# Patient Record
Sex: FEMALE | Race: WHITE | Employment: UNEMPLOYED
[De-identification: names, ages, dates, MRNs, and addresses within clinical notes are randomized per-mention and may not be internally consistent; named-entity substitution may affect disease eponyms.]

---

## 2017-03-03 RX ORDER — NORGESTIMATE AND ETHINYL ESTRADIOL 0.25-0.035
1 KIT ORAL DAILY
Qty: 1 PACKET | Refills: 2 | Status: SHIPPED | OUTPATIENT
Start: 2017-03-03 | End: 2017-03-07 | Stop reason: SDUPTHER

## 2017-03-06 ENCOUNTER — PATIENT MESSAGE (OUTPATIENT)
Dept: OBGYN | Facility: CLINIC | Age: 42
End: 2017-03-06

## 2017-03-07 ENCOUNTER — TELEPHONE (OUTPATIENT)
Dept: OBGYN | Facility: CLINIC | Age: 42
End: 2017-03-07

## 2017-03-07 RX ORDER — NORGESTIMATE AND ETHINYL ESTRADIOL 0.25-0.035
1 KIT ORAL DAILY
Qty: 3 PACKET | Refills: 0 | Status: SHIPPED | OUTPATIENT
Start: 2017-03-07 | End: 2017-10-20 | Stop reason: SDUPTHER

## 2017-06-05 ENCOUNTER — HOSPITAL ENCOUNTER (OUTPATIENT)
Age: 42
Discharge: HOME | End: 2017-06-05
Payer: COMMERCIAL

## 2017-06-05 PROCEDURE — 36415 COLL VENOUS BLD VENIPUNCTURE: CPT

## 2017-06-05 PROCEDURE — 82785 ASSAY OF IGE: CPT

## 2017-06-05 PROCEDURE — 86003 ALLG SPEC IGE CRUDE XTRC EA: CPT

## 2017-06-07 LAB
2000687N OAK TREE IGE: <0.34 KU/L (ref 0–0.34)
ALLERGEN BERMUDA GRASS IGE: <0.34 KU/L (ref 0–0.34)
ALLERGEN BIRCH IGE: <0.34 KU/L (ref 0–0.34)
ALLERGEN DOG DANDER IGE: 3.99 KU/L (ref 0–0.34)
ALLERGEN GERMAN COCKROACH IGE: <0.34 KU/L (ref 0–0.34)
ALLERGEN HELMINTHOSPORIUM HALODES: <0.34 KU/L (ref 0–0.34)
ALLERGEN HORMODENDRUM IGE: <0.34 KUL/L (ref 0–0.34)
ALLERGEN JOHNSON GRASS IGE: <0.34 KU/L (ref 0–0.34)
ALLERGEN LAMB'S QUARTER IGE: <0.34 KU/L (ref 0–0.34)
ALLERGEN PHOMA BETAE: <0.34 KU/L (ref 0–0.34)
ALLERGEN PIGWEED ROUGH IGE: <0.34 KU/L (ref 0–0.34)
ALLERGEN PINE WHITE IGE: <0.34 KU/L (ref 0–0.34)
ALLERGEN SHEEP SORREL (W18) IGE: <0.34 KU/L (ref 0–0.34)
ALLERGEN STEMPHYLIUM HERBARUM IGE: <0.34 KU/L (ref 0–0.34)
ALLERGEN TREE SYCAMORE: <0.34 KU/L (ref 0–0.34)
ALLERGEN WALNUT TREE IGE: <0.34 KU/L (ref 0–0.34)
ALTERNARIA ALTERNATA: <0.34 KU/L (ref 0–0.34)
ASPERGILLUS FUMIGATUS: <0.34 KU/L (ref 0–0.34)
CANDIDA ALBICANS IGE: <0.34 KU/L (ref 0–0.34)
CAT DANDER ANTIBODY: 3.42 KU/L (ref 0–0.34)
COTTONWOOD TREE: <0.34 KU/L (ref 0–0.34)
D. FARINAE: <0.34 KU/L (ref 0–0.34)
D. PTERONYSSINUS: <0.34 KU/L (ref 0–0.34)
ELM TREE: <0.34 KU/L (ref 0–0.34)
FUSARIUM MONILIFORME: <0.34 KU/L (ref 0–0.34)
IGE: 238 IU/ML
MAPLE/BOXELDER TREE: <0.34 KU/L (ref 0–0.34)
P. NOTATUM: <0.34 KU/L (ref 0–0.34)
SHORT RAGWD(A ARTEMIS.) IGE: <0.34 KU/L (ref 0–0.34)
TIMOTHY GRASS: <0.34 KU/L (ref 0–0.34)

## 2017-07-05 ENCOUNTER — TELEPHONE (OUTPATIENT)
Dept: OBGYN CLINIC | Age: 42
End: 2017-07-05

## 2017-10-20 ENCOUNTER — OFFICE VISIT (OUTPATIENT)
Dept: OBGYN CLINIC | Age: 42
End: 2017-10-20
Payer: COMMERCIAL

## 2017-10-20 ENCOUNTER — HOSPITAL ENCOUNTER (OUTPATIENT)
Age: 42
Setting detail: SPECIMEN
Discharge: HOME | End: 2017-10-20
Payer: COMMERCIAL

## 2017-10-20 VITALS
DIASTOLIC BLOOD PRESSURE: 62 MMHG | BODY MASS INDEX: 36.43 KG/M2 | WEIGHT: 213.4 LBS | HEIGHT: 64 IN | SYSTOLIC BLOOD PRESSURE: 110 MMHG

## 2017-10-20 DIAGNOSIS — N87.0 MILD DYSPLASIA OF CERVIX (CIN I): Primary | ICD-10-CM

## 2017-10-20 DIAGNOSIS — Z12.31 SCREENING MAMMOGRAM, ENCOUNTER FOR: ICD-10-CM

## 2017-10-20 PROCEDURE — G8427 DOCREV CUR MEDS BY ELIG CLIN: HCPCS | Performed by: NURSE PRACTITIONER

## 2017-10-20 PROCEDURE — 99213 OFFICE O/P EST LOW 20 MIN: CPT | Performed by: NURSE PRACTITIONER

## 2017-10-20 PROCEDURE — G8417 CALC BMI ABV UP PARAM F/U: HCPCS | Performed by: NURSE PRACTITIONER

## 2017-10-20 PROCEDURE — G8484 FLU IMMUNIZE NO ADMIN: HCPCS | Performed by: NURSE PRACTITIONER

## 2017-10-20 PROCEDURE — 1036F TOBACCO NON-USER: CPT | Performed by: NURSE PRACTITIONER

## 2017-10-20 RX ORDER — NORGESTIMATE AND ETHINYL ESTRADIOL 0.25-0.035
1 KIT ORAL DAILY
Qty: 3 PACKET | Refills: 1 | Status: SHIPPED | OUTPATIENT
Start: 2017-10-20 | End: 2018-06-07 | Stop reason: SDUPTHER

## 2017-10-20 NOTE — PROGRESS NOTES
Subjective: This 43 y.o. woman comes in today for pap smear only. Her most recent annual exam was \"sometime in 2016. Her most recent Pap smear was on 11.22.16 and showed no abnormalities. Previous abnormal Pap smears: yes - LGSIL (DELILAH 1) Contraception: OCP (estrogen/progesterone)    Objective:     /62   Ht 5' 3.75\" (1.619 m)   Wt 213 lb 6.4 oz (96.8 kg)   LMP 10/09/2017   Breastfeeding? No   BMI 36.92 kg/m²   Pelvic Exam: cervix normal in appearance, external genitalia normal, vagina normal without discharge. Pap smear obtained. Assessment:     Screening pap smear. Plan:     Follow up in 6 month, or as indicated by Pap results.

## 2017-10-23 LAB
HPV SAMPLE: NORMAL
HPV SOURCE: NORMAL
HPV, GENOTYPE 16: NOT DETECTED
HPV, GENOTYPE 18: NOT DETECTED
HPV, HIGH RISK OTHER: NOT DETECTED
HPV, INTERPRETATION: NORMAL

## 2017-10-26 LAB — CYTOLOGY REPORT: NORMAL

## 2018-06-08 RX ORDER — NORGESTIMATE AND ETHINYL ESTRADIOL 0.25-0.035
KIT ORAL
Qty: 84 TABLET | Refills: 0 | Status: SHIPPED | OUTPATIENT
Start: 2018-06-08 | End: 2020-10-22 | Stop reason: ALTCHOICE

## 2018-12-27 ENCOUNTER — HOSPITAL ENCOUNTER (OUTPATIENT)
Age: 43
Discharge: HOME | End: 2018-12-27
Payer: COMMERCIAL

## 2018-12-27 LAB
IGA: 373 MG/DL (ref 70–400)
IGG: 1334 MG/DL (ref 700–1600)
IGM: 131 MG/DL (ref 40–230)

## 2018-12-27 PROCEDURE — 82784 ASSAY IGA/IGD/IGG/IGM EACH: CPT

## 2018-12-27 PROCEDURE — 82787 IGG 1 2 3 OR 4 EACH: CPT

## 2018-12-27 PROCEDURE — 86038 ANTINUCLEAR ANTIBODIES: CPT

## 2018-12-27 PROCEDURE — 36415 COLL VENOUS BLD VENIPUNCTURE: CPT

## 2018-12-28 LAB — ANTI-NUCLEAR ANTIBODY (ANA): NEGATIVE

## 2018-12-29 LAB
IGD: <0.7 MG/DL
IGG 1: 757 MG/DL (ref 240–1118)
IGG 2: 461 MG/DL (ref 124–549)
IGG 3: 58 MG/DL (ref 21–134)
IGG 4: 45 MG/DL (ref 1–123)

## 2019-02-02 ENCOUNTER — HOSPITAL ENCOUNTER (OUTPATIENT)
Dept: CT IMAGING | Age: 44
Discharge: HOME | End: 2019-02-04
Payer: COMMERCIAL

## 2019-02-02 DIAGNOSIS — R09.81 NASAL CONGESTION: ICD-10-CM

## 2019-02-02 PROCEDURE — 70486 CT MAXILLOFACIAL W/O DYE: CPT

## 2019-10-31 ENCOUNTER — TELEPHONE (OUTPATIENT)
Dept: OBGYN CLINIC | Age: 44
End: 2019-10-31

## 2020-10-22 ENCOUNTER — HOSPITAL ENCOUNTER (OUTPATIENT)
Age: 45
Setting detail: SPECIMEN
Discharge: HOME | End: 2020-10-22
Payer: COMMERCIAL

## 2020-10-22 ENCOUNTER — OFFICE VISIT (OUTPATIENT)
Dept: OBGYN CLINIC | Age: 45
End: 2020-10-22
Payer: COMMERCIAL

## 2020-10-22 VITALS
SYSTOLIC BLOOD PRESSURE: 94 MMHG | DIASTOLIC BLOOD PRESSURE: 68 MMHG | WEIGHT: 199 LBS | BODY MASS INDEX: 33.97 KG/M2 | HEIGHT: 64 IN

## 2020-10-22 PROBLEM — J32.9 CHRONIC SINUSITIS: Status: ACTIVE | Noted: 2018-09-13

## 2020-10-22 PROBLEM — F32.9 MAJOR DEPRESSION, SINGLE EPISODE: Status: ACTIVE | Noted: 2020-10-22

## 2020-10-22 PROBLEM — E11.9 TYPE 2 DIABETES MELLITUS WITHOUT COMPLICATION (HCC): Status: ACTIVE | Noted: 2020-10-22

## 2020-10-22 PROBLEM — J30.2 SEASONAL ALLERGIES: Status: ACTIVE | Noted: 2020-10-22

## 2020-10-22 PROBLEM — F41.9 ANXIETY DISORDER: Status: ACTIVE | Noted: 2017-06-19

## 2020-10-22 PROBLEM — K76.0 NONALCOHOLIC FATTY LIVER DISEASE: Status: ACTIVE | Noted: 2020-10-22

## 2020-10-22 PROBLEM — E11.51 PERIPHERAL VASCULAR DISORDER DUE TO DIABETES MELLITUS (HCC): Status: ACTIVE | Noted: 2020-10-22

## 2020-10-22 PROCEDURE — G8484 FLU IMMUNIZE NO ADMIN: HCPCS | Performed by: NURSE PRACTITIONER

## 2020-10-22 PROCEDURE — 99386 PREV VISIT NEW AGE 40-64: CPT | Performed by: NURSE PRACTITIONER

## 2020-10-22 RX ORDER — METFORMIN HYDROCHLORIDE 500 MG/1
TABLET, EXTENDED RELEASE ORAL
COMMUNITY
Start: 2020-07-28

## 2020-10-22 RX ORDER — EMPAGLIFLOZIN 25 MG/1
TABLET, FILM COATED ORAL
COMMUNITY
Start: 2020-07-17

## 2020-10-22 RX ORDER — ACETAMINOPHEN AND CODEINE PHOSPHATE 120; 12 MG/5ML; MG/5ML
1 SOLUTION ORAL DAILY
Qty: 84 TABLET | Refills: 4 | Status: SHIPPED | OUTPATIENT
Start: 2020-10-22 | End: 2021-03-14 | Stop reason: SDUPTHER

## 2020-10-22 RX ORDER — ESCITALOPRAM OXALATE 20 MG/1
TABLET ORAL
COMMUNITY
Start: 2020-07-28

## 2020-10-22 RX ORDER — GLIMEPIRIDE 4 MG/1
TABLET ORAL
COMMUNITY
Start: 2020-09-02

## 2020-10-22 ASSESSMENT — ENCOUNTER SYMPTOMS
ABDOMINAL DISTENTION: 0
ABDOMINAL PAIN: 0
CONSTIPATION: 0
BACK PAIN: 0
SHORTNESS OF BREATH: 0
DIARRHEA: 0
COUGH: 0

## 2020-10-22 NOTE — PROGRESS NOTES
HPI:     Jose Nolan a 39 y.o. female who presents today for:No chief complaint on file. HPI  Here for annual exam. Works at [employer]. Has 1 child- 13 y/o. Trying to add exercise. Recently dx w/HTN, DM2. Discussed contraindication for estrogen. Agreeable to change to POP. Rx to pharmacy. GYNECOLOGICHISTORY:  MenstrualHistory: No LMP recorded. Sexually Transmitted Infections: No  History of Ectopic Pregnancy:No  Menarche: 13  Cycles q 28 Days  Durationof cycles: 4  Dysmenorrhea: minmal  Sexually active: yes  Dyspareunia: none    Current Outpatient Medications   Medication Sig Dispense Refill    ESTARYLLA 0.25-35 MG-MCG per tablet TAKE 1 TABLET DAILY 84 tablet 0    ibuprofen (ADVIL;MOTRIN) 600 MG tablet Take 1 tablet by mouth every 6 hours as needed for Pain 30 tablet 0    buPROPion SR (WELLBUTRIN SR) 150 MG SR tablet Take 150 mg by mouth 2 times daily       busPIRone (BUSPAR) 15 MG tablet Take 15 mg by mouth 2 times daily       lisinopril-hydrochlorothiazide (PRINZIDE;ZESTORETIC) 20-25 MG per tablet Take 1 tablet by mouth daily       pravastatin (PRAVACHOL) 10 MG tablet Take 10 mg by mouth nightly       Multiple Vitamin (MULTI VITAMIN DAILY PO) Take 1 tablet by mouth daily       calcium carbonate (OSCAL) 500 MG TABS tablet Take 500 mg by mouth daily. No current facility-administered medications for this visit.       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+     Denies family history of breast, ovarian, uterus, colon CA     Past Surgical History:   Procedure Laterality Date    APPENDECTOMY       SECTION      x 2    COLPOSCOPY  03/11/15    DELILAH-1    COLPOSCOPY  16    Cx Bx DELILAH-1    LEEP  16     Family History   Problem Relation Age of Onset    High Blood Pressure Father     High Cholesterol Father     Heart Defect Paternal Grandmother     Heart Defect Paternal Grandfather     Other Mother 61         bowel problem, bleeding    Other Daughter 23         del, multiple health issues      Social History     Tobacco Use    Smoking status: Never Smoker    Smokeless tobacco: Never Used   Substance Use Topics    Alcohol use: No        Subjective:      Review of Systems   Constitutional: Negative for appetite change and fatigue. HENT: Negative for congestion and hearing loss. Eyes: Negative for visual disturbance. Respiratory: Negative for cough and shortness of breath. Cardiovascular: Negative for chest pain and palpitations. Gastrointestinal: Negative for abdominal distention, abdominal pain, constipation and diarrhea. Genitourinary: Negative for flank pain, frequency, menstrual problem, pelvic pain and vaginal discharge. Musculoskeletal: Negative for back pain. Neurological: Negative for syncope and headaches. Psychiatric/Behavioral: Negative for behavioral problems. Objective: There were no vitals taken for this visit. Physical Exam  Constitutional:       Appearance: She is well-developed. HENT:      Head: Normocephalic. Eyes:      Extraocular Movements: Extraocular movements intact. Conjunctiva/sclera: Conjunctivae normal.   Neck:      Musculoskeletal: Normal range of motion. Thyroid: No thyromegaly. Trachea: No tracheal deviation. Pulmonary:      Effort: Pulmonary effort is normal. No respiratory distress. Chest:      Breasts: Breasts are symmetrical.         Right: No inverted nipple. Left: No inverted nipple, mass, nipple discharge, skin change or tenderness. Abdominal:      General: Bowel sounds are normal. There is no distension. Palpations: Abdomen is soft. There is no mass. Tenderness: There is no abdominal tenderness.       Hernia: There is no hernia in the left inguinal area. Genitourinary:     Labia:         Right: No rash or lesion. Left: No rash or lesion. Vagina: No vaginal discharge or tenderness. Cervix: No cervical motion tenderness, discharge or friability. Uterus: Not deviated, not enlarged and not fixed. Adnexa:         Right: No mass, tenderness or fullness. Left: No mass, tenderness or fullness. Musculoskeletal: Normal range of motion. General: No tenderness. Lymphadenopathy:      Cervical: No cervical adenopathy. Skin:     General: Skin is warm and dry. Neurological:      General: No focal deficit present. Mental Status: She is alert and oriented to person, place, and time. Mental status is at baseline. Psychiatric:         Mood and Affect: Mood normal.         Behavior: Behavior normal.         Thought Content: Thought content normal.         Judgment: Judgment normal.           Assessment:     1. Encounter for gynecological examination    2. Encounter for screening mammogram for breast cancer          Plan:   1. Pap collected. Discussed new pap smear guidelines. Desires re-pap in 1 year. 2. Screening mammogram discussed and advised yearly if within normal limits. 3. Calcium and Vitamin D dosing reviewed. 4. Colonoscopy screening reviewed. 5. Bone density testing reviewed.    OCP changed to POP  Electronicallysigned by Mauri Eid on 10/22/2020

## 2020-10-22 NOTE — PATIENT INSTRUCTIONS
Patient Education        norethindrone  Pronunciation:  nor eth IN drone  Brand:  Aygestin, Tess, Jolie, Jolivette, Monie-Be, Ortho Micronor  What is the most important information I should know about norethindrone? You should not use this medicine if you you have: undiagnosed vaginal bleeding, breast cancer, liver disease, or a liver tumor. You may not be able to take norethindrone if you have ever had a heart attack, a stroke, or blood clot. Do not use if you are pregnant or trying to become pregnant. In some cases, you should not take norethindrone if you are nursing. What is norethindrone? Norethindrone is a form of progesterone, a female hormone important for regulating ovulation and menstruation. Norethindrone is used for birth control (contraception) to prevent pregnancy. Norethindrone is also used to treat menstrual disorders, endometriosis, or abnormal vaginal bleeding caused by a hormone imbalance. Not all brands of this medicine are for the same uses. Some brands are for use only as contraception. Others are for use in treating endometriosis or vaginal bleeding disorders. Avoid medication errors by using only the brand, form, and strength your doctor prescribes. Norethindrone may also be used for other purposes not listed in this medication guide. What should I discuss with my healthcare provider before using norethindrone? You should not use norethindrone if you are allergic to it, or if you have:  · unusual vaginal bleeding that has not been checked by a doctor;  · liver disease or a liver tumor;  · breast cancer; or  · a history of blood clots in your brain, eyes, lungs, or legs. Do not use norethindrone if you are pregnant or trying to become pregnant. Stop taking the medicine and tell your doctor right away if you become pregnant. Ask your doctor about using this medicine while you are breast-feeding. In some cases, you should not take norethindrone if you are nursing.   Tell your doctor if you have ever had:  · heart disease, high blood pressure;  · liver disease;  · depression;  · migraine headaches;  · diabetes;  · high cholesterol or triglycerides;  · uterine fibroid tumors;  · epilepsy;  · kidney disease;  · asthma; or  · if you smoke. Do not give this medicine to a child without medical advice. How should I take norethindrone? Follow all directions on your prescription label and read all medication guides or instruction sheets. Use the medicine exactly as directed. Carefully follow your doctor's dosing instructions about when to start taking norethindrone for contraception if you are switching from a combination birth control pill (estrogen and progestin). If you take norethindrone for contraception: Take one pill every day, no more than 24 hours apart. You may get pregnant if you do not take one pill daily. You may need to use back-up birth control (such as condoms with spermicide) if you are sick with vomiting or diarrhea, or if you are 3 or more hours late in taking your daily dose. If you take norethindrone for menstrual disorders or abnormal vaginal bleeding: You will most likely take the medicine for only 5 to 10 days. Vaginal bleeding will occur 3 to 7 days after your last dose. If you take norethindrone for endometriosis: Norethindrone is usually taken daily long-term for several months. Your doctor may occasionally change your dose. Your doctor should check your progress on a regular basis. Self-examine your breasts for lumps on a monthly basis, and have regular mammograms. Report any unusual vaginal bleeding right away. Norethindrone can affect the results of certain medical tests. Tell any doctor who treats you that you are using norethindrone. Store this medication at room temperature away from moisture, heat, and light. What happens if I miss a dose? Call your doctor for instructions, or follow the patient instructions provided with your medicine.   Missing a pain, bloating, nausea, vomiting;  · hair loss;  · depressed mood, trouble sleeping;  · weight gain; or  · vaginal itching or discharge. This is not a complete list of side effects and others may occur. Call your doctor for medical advice about side effects. You may report side effects to FDA at 5-922-FDA-1561. What other drugs will affect norethindrone? Some drugs can make norethindrone less effective, which may result in unintended pregnancy if you use this medicine for contraception. Tell your doctor about all your other medicines, especially:  · Manteno's wort;  · medicine to treat an infection (antibiotics or antifungal medicine);  · medicine to treat tuberculosis;  · medicine to treat HIV or AIDS; or  · seizure medication. This list is not complete. Other drugs may affect norethindrone, including prescription and over-the-counter medicines, vitamins, and herbal products. Not all possible drug interactions are listed here. Where can I get more information? Your pharmacist can provide more information about norethindrone. Remember, keep this and all other medicines out of the reach of children, never share your medicines with others, and use this medication only for the indication prescribed. Every effort has been made to ensure that the information provided by [address] is accurate, up-to-date, and complete, but no guarantee is made to that effect. Drug information contained herein may be time sensitive. [facility] information has been compiled for use by healthcare practitioners and consumers in the [place] and therefore [facility] does not warrant that uses outside of the [place] are appropriate, unless specifically indicated otherwise. [facility]'s drug information does not endorse drugs, diagnose patients or recommend therapy.  [facility] drug information is an informational resource designed to assist licensed healthcare practitioners in caring for their patients and/or to serve consumers viewing this service as a supplement to, and not a substitute for, the expertise, skill, knowledge and judgment of healthcare practitioners. The absence of a warning for a given drug or drug combination in no way should be construed to indicate that the drug or drug combination is safe, effective or appropriate for any given patient. [facility] does not assume any responsibility for any aspect of healthcare administered with the aid of information [facility] provides. The information contained herein is not intended to cover all possible uses, directions, precautions, warnings, drug interactions, allergic reactions, or adverse effects. If you have questions about the drugs you are taking, check with your doctor, nurse or pharmacist.  Copyright 9870-0208 [facility] [place]. Version: 3.01. Revision date: 2/18/2019. Care instructions adapted under license by [facility] ([facility]). If you have questions about a medical condition or this instruction, always ask your healthcare professional. [address] any warranty or liability for your use of this information.

## 2020-10-30 LAB — CYTOLOGY REPORT: NORMAL

## 2020-11-06 LAB
HPV SOURCE: NORMAL
HPV, GENOTYPE 16: NOT DETECTED
HPV, GENOTYPE 18: NOT DETECTED
HPV, HIGH RISK OTHER: NOT DETECTED

## 2020-12-19 ENCOUNTER — HOSPITAL ENCOUNTER (OUTPATIENT)
Dept: MAMMOGRAPHY | Age: 45
Discharge: HOME | End: 2020-12-21
Payer: COMMERCIAL

## 2020-12-19 PROCEDURE — 77063 BREAST TOMOSYNTHESIS BI: CPT

## 2021-04-03 ENCOUNTER — HOSPITAL ENCOUNTER (OUTPATIENT)
Age: 46
Discharge: HOME | End: 2021-04-03
Payer: COMMERCIAL

## 2021-04-03 LAB
C-PEPTIDE: 2.1 NG/ML (ref 1.1–4.4)
GLUCOSE BLD-MCNC: 259 MG/DL (ref 70–99)

## 2021-04-03 PROCEDURE — 82947 ASSAY GLUCOSE BLOOD QUANT: CPT

## 2021-04-03 PROCEDURE — 36415 COLL VENOUS BLD VENIPUNCTURE: CPT

## 2021-04-03 PROCEDURE — 86341 ISLET CELL ANTIBODY: CPT

## 2021-04-03 PROCEDURE — 84681 ASSAY OF C-PEPTIDE: CPT

## 2021-04-08 LAB — GLUTAMIC ACID DECARB AB: >250 IU/ML (ref 0–5)

## 2022-12-13 ENCOUNTER — OFFICE VISIT (OUTPATIENT)
Dept: OBGYN CLINIC | Age: 47
End: 2022-12-13
Payer: COMMERCIAL

## 2022-12-13 ENCOUNTER — HOSPITAL ENCOUNTER (OUTPATIENT)
Age: 47
Setting detail: SPECIMEN
Discharge: HOME | End: 2022-12-13

## 2022-12-13 VITALS
HEIGHT: 63 IN | SYSTOLIC BLOOD PRESSURE: 112 MMHG | DIASTOLIC BLOOD PRESSURE: 86 MMHG | BODY MASS INDEX: 39.87 KG/M2 | WEIGHT: 225 LBS

## 2022-12-13 DIAGNOSIS — Z12.31 ENCOUNTER FOR SCREENING MAMMOGRAM FOR BREAST CANCER: ICD-10-CM

## 2022-12-13 DIAGNOSIS — Z01.419 ENCOUNTER FOR GYNECOLOGICAL EXAMINATION: Primary | ICD-10-CM

## 2022-12-13 DIAGNOSIS — N92.0 MENORRHAGIA WITH REGULAR CYCLE: ICD-10-CM

## 2022-12-13 PROCEDURE — 58300 INSERT INTRAUTERINE DEVICE: CPT | Performed by: NURSE PRACTITIONER

## 2022-12-13 PROCEDURE — 3078F DIAST BP <80 MM HG: CPT | Performed by: NURSE PRACTITIONER

## 2022-12-13 PROCEDURE — 99396 PREV VISIT EST AGE 40-64: CPT | Performed by: NURSE PRACTITIONER

## 2022-12-13 PROCEDURE — 3074F SYST BP LT 130 MM HG: CPT | Performed by: NURSE PRACTITIONER

## 2022-12-13 RX ORDER — DULAGLUTIDE 3 MG/.5ML
INJECTION, SOLUTION SUBCUTANEOUS
COMMUNITY

## 2022-12-13 RX ORDER — INSULIN GLARGINE 100 [IU]/ML
INJECTION, SOLUTION SUBCUTANEOUS
COMMUNITY

## 2022-12-13 RX ORDER — ASPIRIN 81 MG/1
TABLET, CHEWABLE ORAL
COMMUNITY

## 2022-12-13 RX ORDER — PRAVASTATIN SODIUM 20 MG
TABLET ORAL
COMMUNITY

## 2022-12-13 RX ORDER — CETIRIZINE HYDROCHLORIDE 10 MG/1
CAPSULE, LIQUID FILLED ORAL
COMMUNITY

## 2022-12-13 RX ORDER — INSULIN LISPRO 100 [IU]/ML
INJECTION, SOLUTION INTRAVENOUS; SUBCUTANEOUS
COMMUNITY

## 2022-12-13 ASSESSMENT — PATIENT HEALTH QUESTIONNAIRE - PHQ9
SUM OF ALL RESPONSES TO PHQ QUESTIONS 1-9: 0
SUM OF ALL RESPONSES TO PHQ9 QUESTIONS 1 & 2: 0
SUM OF ALL RESPONSES TO PHQ QUESTIONS 1-9: 0
2. FEELING DOWN, DEPRESSED OR HOPELESS: 0
1. LITTLE INTEREST OR PLEASURE IN DOING THINGS: 0
SUM OF ALL RESPONSES TO PHQ QUESTIONS 1-9: 0
SUM OF ALL RESPONSES TO PHQ QUESTIONS 1-9: 0

## 2022-12-13 ASSESSMENT — ENCOUNTER SYMPTOMS
ALLERGIC/IMMUNOLOGIC NEGATIVE: 1
COUGH: 0
SHORTNESS OF BREATH: 0
BACK PAIN: 0
GASTROINTESTINAL NEGATIVE: 1
ABDOMINAL DISTENTION: 0
RESPIRATORY NEGATIVE: 1

## 2022-12-13 NOTE — PROGRESS NOTES
[address] [facility] [address] [place] [address]  Dept: [phone]      22    Jovanny Kramer     Gyn Annual Exam      CHIEF COMPLAINT:    Chief Complaint   Patient presents with    Annual Exam     Last pap 10/22/20 ASCUS HPV- last mammogram 22-WNL               Blood pressure 112/86, height 5' 2.5\" (1.588 m), weight 225 lb (102.1 kg), last menstrual period 2022, not currently breastfeeding. HPI :     Jovanny Kramer 1975 is a 52 y.o.   who is here for her annual exam.     Works at [employer]. Has 1 child- 19y/o. Trying to add exercise. She reports still having periods every 30 days, lasting 4 days. She stopped her ocps and is interested in other options. After comparing Nexplanon with Mirena she would like to proceed today with Mirena insertion. Dysmenorrhea- no   Menorrhagia- x 1-2  Denies hot flushes or vaginal dryness. Uses condoms for contraception.    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+                                                                   Past Surgical History:   Procedure Laterality Date    APPENDECTOMY       SECTION      x 2    COLPOSCOPY  2015    DELILAH-1    COLPOSCOPY  2016    Cx Bx DELILAH-1    INTRAUTERINE DEVICE INSERTION  2022    Mirena    LEEP  2016     Family History   Problem Relation Age of Onset    High Blood Pressure Father     High Cholesterol Father     Heart Defect Paternal Grandmother     Diabetes Paternal Grandmother     Heart Defect Paternal Grandfather     Other Mother 61         bowel problem, bleeding    Mental Illness Mother     Other Daughter 23         del, multiple health issues      Social History     Tobacco Use   Smoking Status Never   Smokeless Tobacco Never     Social History     Substance and Sexual Activity   Alcohol Use No     Current Outpatient Medications   Medication Sig Dispense Refill    aspirin 81 MG chewable tablet 1 tablet      insulin lispro, 1 Unit Dial, (HUMALOG/ADMELOG) 100 UNIT/ML SOPN 1:5 + sliding scale, up to 85 units      insulin glargine (LANTUS SOLOSTAR) 100 UNIT/ML injection pen 80 units      Probiotic Product (PROBIOTIC PO) as directed      Cetirizine HCl (ZYRTEC ALLERGY) 10 MG CAPS 1 tablet      Dulaglutide (TRULICITY) 3 MR/6.3PA SOPN inject 3mg      pravastatin (PRAVACHOL) 20 MG tablet 1 tablet      escitalopram (LEXAPRO) 20 MG tablet       busPIRone (BUSPAR) 15 MG tablet Take 15 mg by mouth 2 times daily       lisinopril-hydrochlorothiazide (PRINZIDE;ZESTORETIC) 20-25 MG per tablet Take 1 tablet by mouth daily       Multiple Vitamin (MULTI VITAMIN DAILY PO) Take 1 tablet by mouth daily       calcium carbonate (OSCAL) 500 MG TABS tablet Take 500 mg by mouth daily. Current Facility-Administered Medications   Medication Dose Route Frequency Provider Last Rate Last Admin    levonorgestrel (MIRENA) IUD 52 mg 1 each  1 each IntraUTERine Once Ida Ray, APRN - CNP           PHQ-9 Total Score: 0 (2022  7:54 AM)     **If either question is answered in a  positive fashion then complete the PHQ9 Scoring Evaluation and make the appropriate referral**        Allergies:  Pcn [penicillins]    Gynecologic History:  Patient's last menstrual period was 2022. Sexually Active:Yes  Dyspareunia: no  STD History: No  Colonoscopy:  cologuard  Fam Hx Breast, Ovarian, Colorectal Ca: no    OB History    Para Term  AB Living   2 2 1 1 0 1   SAB IAB Ectopic Molar Multiple Live Births   0 0 0 0 0 2       Review of Systems   Constitutional: Negative. Negative for activity change, appetite change and fatigue. HENT: Negative. Respiratory: Negative. Negative for cough and shortness of breath. Cardiovascular: Negative. Gastrointestinal: Negative. Negative for abdominal distention. Endocrine: Negative. Genitourinary: Negative. Musculoskeletal:  Negative for arthralgias and back pain. Skin: Negative. Allergic/Immunologic: Negative. Neurological:  Negative for dizziness and light-headedness. Physical Exam  Vitals reviewed. Constitutional:       Appearance: Normal appearance. HENT:      Head: Normocephalic. Cardiovascular:      Rate and Rhythm: Normal rate and regular rhythm. Pulmonary:      Effort: Pulmonary effort is normal.      Breath sounds: Normal breath sounds. Chest:   Breasts:     Right: Normal. No mass, skin change or tenderness. Left: Normal. No mass, skin change or tenderness. Abdominal:      General: Abdomen is flat. Palpations: Abdomen is soft. Genitourinary:     General: Normal vulva. Labia:         Right: No rash or lesion. Left: No rash or lesion. Urethra: No prolapse. Vagina: Normal. No vaginal discharge or lesions. Cervix: No friability or lesion. Uterus: Normal. Not enlarged and not tender. Adnexa: Right adnexa normal and left adnexa normal.        Right: No mass or tenderness. Left: No mass or tenderness. Comments: Bimanual exam limited by body habitus  Musculoskeletal:         General: Normal range of motion. Cervical back: Neck supple. Lymphadenopathy:      Upper Body:      Right upper body: No supraclavicular or axillary adenopathy. Left upper body: No supraclavicular or axillary adenopathy. Skin:     General: Skin is warm and dry. Neurological:      Mental Status: She is alert. Psychiatric:         Mood and Affect: Mood normal.        No vulvar, vaginal or cervical lesions or inflammation . There is normal appearing arch. Uterus nongravid and without CMT.   Adnexa nontender without abnormal masses bilaterally. The anterior lip of the cervix was grasped with a single-tooth tenaculum and the uterus was sounded to 8 centimeter. The IUD was placed uneventfully to the fundus and the IUD string trimmed to approximately 2.5-3 cm. Post insertion instructions discussed and given to the patient. ICD-10-CM    1. Encounter for gynecological examination  Z01.419 PAP SMEAR      2. Encounter for screening mammogram for breast cancer  Z12.31 CAPRI MALLY DIGITAL SCREEN BILATERAL      3. Menorrhagia with regular cycle  N92.0 NV INSERT INTRAUTERINE DEVICE     levonorgestrel (MIRENA) IUD 52 mg 1 each        She will return to the office  in 6-8 weeks for a brief IUD checkup or when necessary    Sunshine Hernandez, APRN - CNP 12/13/2022 8:23 AM    [facility]            ASSESSMENT/PLAN    52 y.o. Female; Annual   Diagnosis Orders   1. Encounter for gynecological examination  PAP SMEAR      2. Encounter for screening mammogram for breast cancer  CAPRI MALLY DIGITAL SCREEN BILATERAL      3. Menorrhagia with regular cycle  NV INSERT INTRAUTERINE DEVICE    levonorgestrel (MIRENA) IUD 52 mg 1 each          1. Encounter for gynecological examination  -     PAP SMEAR; Future  2. Encounter for screening mammogram for breast cancer  -     CAPRI MALLY DIGITAL SCREEN BILATERAL; Future  3. Menorrhagia with regular cycle  -     NV INSERT INTRAUTERINE DEVICE  -     levonorgestrel (MIRENA) IUD 52 mg 1 each; 1 each, IntraUTERine, ONCE, 1 dose, On Tue 12/13/22 at 0845             Hereditary Breast, Ovarian, Colon and Uterine Cancer screening Done. Tobacco & Secondary smoke risks reviewed; instructed on cessation and avoidance    - Pap collected per ASCCP guidelines.  -Discussed menopausal symptoms, HRT, incontinence. - Screening mammogram discussed and advised yearly if normalstarting at age 36.  - Calcium and Vitamin D dosing reviewed. - Colonoscopy screening reviewed. - General diet and exercise reviewed. - Routine health maintenance per patients PCP. Return in about 6 weeks (around 1/24/2023) for iud check.         Electronically signed by GIN Morales CNP on 12/13/22 at 7:53 AM EST

## 2022-12-15 LAB
HPV SAMPLE: NORMAL
HPV, GENOTYPE 16: NOT DETECTED
HPV, GENOTYPE 18: NOT DETECTED
HPV, HIGH RISK OTHER: NOT DETECTED
HPV, INTERPRETATION: NORMAL
SPECIMEN DESCRIPTION: NORMAL

## 2023-02-07 ENCOUNTER — PROCEDURE VISIT (OUTPATIENT)
Dept: OBGYN CLINIC | Age: 48
End: 2023-02-07
Payer: COMMERCIAL

## 2023-02-07 VITALS
HEIGHT: 63 IN | SYSTOLIC BLOOD PRESSURE: 122 MMHG | WEIGHT: 223 LBS | BODY MASS INDEX: 39.51 KG/M2 | DIASTOLIC BLOOD PRESSURE: 86 MMHG

## 2023-02-07 DIAGNOSIS — Z30.431 ENCOUNTER FOR ROUTINE CHECKING OF INTRAUTERINE CONTRACEPTIVE DEVICE (IUD): Primary | ICD-10-CM

## 2023-02-07 PROCEDURE — G8427 DOCREV CUR MEDS BY ELIG CLIN: HCPCS | Performed by: NURSE PRACTITIONER

## 2023-02-07 PROCEDURE — G8417 CALC BMI ABV UP PARAM F/U: HCPCS | Performed by: NURSE PRACTITIONER

## 2023-02-07 PROCEDURE — G8482 FLU IMMUNIZE ORDER/ADMIN: HCPCS | Performed by: NURSE PRACTITIONER

## 2023-02-07 PROCEDURE — 3079F DIAST BP 80-89 MM HG: CPT | Performed by: NURSE PRACTITIONER

## 2023-02-07 PROCEDURE — 3074F SYST BP LT 130 MM HG: CPT | Performed by: NURSE PRACTITIONER

## 2023-02-07 PROCEDURE — 99213 OFFICE O/P EST LOW 20 MIN: CPT | Performed by: NURSE PRACTITIONER

## 2023-02-07 PROCEDURE — 1036F TOBACCO NON-USER: CPT | Performed by: NURSE PRACTITIONER

## 2023-02-07 ASSESSMENT — ENCOUNTER SYMPTOMS
RESPIRATORY NEGATIVE: 1
GASTROINTESTINAL NEGATIVE: 1

## 2023-02-07 NOTE — PROGRESS NOTES
[address] [facility] Moises Lopes  [address] [place] [address] [address] [facility]  Dept: [phone]  Dept Fax: [phone]         2023  Jose Book       Chief Complaint  Chief Complaint   Patient presents with    Follow Up After Procedure     Mirena placed 22 - random spotting     . Blood pressure 122/86, height 5' 2.5\" (1.588 m), weight 223 lb (101.2 kg), not currently breastfeeding. HPI:     Jose Book  1975 is a 52 y.o.  here today for IUD check. She had a Mirena inserted in December due to menorrhagia. She has been experiencing spotting. She has had one cycle that lasted 4 days. She denies any pain.        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 (lgsil) 02/19/15    HPV+       Past Surgical History:   Procedure Laterality Date    APPENDECTOMY       SECTION      x 2    COLPOSCOPY  2015    DELILAH-1    COLPOSCOPY  2016    Cx Bx DELILAH-1    INTRAUTERINE DEVICE INSERTION  2022    Mirena    LEEP  2016       Family History   Problem Relation Age of Onset    High Blood Pressure Father     High Cholesterol Father     Heart Defect Paternal Grandmother     Diabetes Paternal Grandmother     Heart Defect Paternal Grandfather     Other Mother 61         bowel problem, bleeding    Mental Illness Mother     Other Daughter 23         del, multiple health issues        Social History     Socioeconomic History    Marital status:      Spouse name: Not on file    Number of children: Not on file    Years of education: Not on file    Highest education level: Not on file   Occupational History    Not on file   Tobacco Use    Smoking status: Never    Smokeless tobacco: Never   Vaping Use    Vaping Use: Never used   Substance and Sexual Activity    Alcohol use: No    Drug use: No    Sexual activity: Yes     Partners: Male     Birth control/protection: I.U.D. Other Topics Concern    Not on file   Social History Narrative    Not on file     Social Determinants of Health     Financial Resource Strain: Not on file   Food Insecurity: Not on file   Transportation Needs: Not on file   Physical Activity: Not on file   Stress: Not on file   Social Connections: Not on file   Intimate Partner Violence: Not on file   Housing Stability: Not on file       No data recorded     **If either question is answered in a  positive fashion then complete the PHQ9 Scoring Evaluation and make the appropriate referral**    MEDICATIONS:  Current Outpatient Medications   Medication Sig Dispense Refill    aspirin 81 MG chewable tablet 1 tablet      insulin lispro, 1 Unit Dial, (HUMALOG/ADMELOG) 100 UNIT/ML SOPN 1:5 + sliding scale, up to 85 units      insulin glargine (LANTUS SOLOSTAR) 100 UNIT/ML injection pen 80 units      Probiotic Product (PROBIOTIC PO) as directed      Cetirizine HCl (ZYRTEC ALLERGY) 10 MG CAPS 1 tablet      Dulaglutide (TRULICITY) 3 ST/7.0EC SOPN inject 3mg      pravastatin (PRAVACHOL) 20 MG tablet 1 tablet      escitalopram (LEXAPRO) 20 MG tablet       busPIRone (BUSPAR) 15 MG tablet Take 15 mg by mouth 2 times daily       lisinopril-hydrochlorothiazide (PRINZIDE;ZESTORETIC) 20-25 MG per tablet Take 1 tablet by mouth daily       Multiple Vitamin (MULTI VITAMIN DAILY PO) Take 1 tablet by mouth daily       calcium carbonate (OSCAL) 500 MG TABS tablet Take 500 mg by mouth daily.        Current Facility-Administered Medications   Medication Dose Route Frequency Provider Last Rate Last Admin    levonorgestrel (MIRENA) IUD 52 mg 1 each  1 each IntraUTERine Once GIN Vincent CNP             ALLERGIES:  Allergies as of 02/07/2023 - Fully Reviewed 02/07/2023   Allergen Reaction Noted    Pcn [penicillins] Hives 02/19/2015         REVIEW OF SYSTEMS:        Review of Systems   Constitutional: Negative. HENT: Negative. Respiratory: Negative. Cardiovascular: Negative. Gastrointestinal: Negative. Genitourinary: Negative. Physical Exam  Constitutional:       Appearance: Normal appearance. Pulmonary:      Effort: Pulmonary effort is normal.   Genitourinary:     General: Normal vulva. Vagina: Normal.      Cervix: Normal.      Comments: IUD strings visualized  Musculoskeletal:         General: Normal range of motion. Cervical back: Neck supple. Skin:     General: Skin is warm and dry. Neurological:      Mental Status: She is alert. Assessment/Plan   Diagnosis Orders   1. Encounter for routine checking of intrauterine contraceptive device (IUD)          1. Encounter for routine checking of intrauterine contraceptive device (IUD)   IUD in good position.   Reassured spotting in normal in the first 6 months      Return in about 10 months (around 12/13/2023) for annual exam.        Electronically signed by GIN Vincent CNP 2/7/2023 8:23 AM

## 2023-06-24 ENCOUNTER — HOSPITAL ENCOUNTER (OUTPATIENT)
Dept: MAMMOGRAPHY | Age: 48
End: 2023-06-24
Payer: COMMERCIAL

## 2023-06-24 DIAGNOSIS — Z12.31 ENCOUNTER FOR SCREENING MAMMOGRAM FOR BREAST CANCER: ICD-10-CM

## 2023-06-24 PROCEDURE — 77063 BREAST TOMOSYNTHESIS BI: CPT

## 2023-06-26 DIAGNOSIS — R92.8 ABNORMAL MAMMOGRAM OF BOTH BREASTS: Primary | ICD-10-CM

## 2023-06-26 DIAGNOSIS — R92.8 ABNORMAL MAMMOGRAM OF RIGHT BREAST: Primary | ICD-10-CM

## 2023-07-24 ENCOUNTER — HOSPITAL ENCOUNTER (OUTPATIENT)
Dept: MAMMOGRAPHY | Age: 48
Discharge: HOME | End: 2023-07-26
Payer: COMMERCIAL

## 2023-07-24 ENCOUNTER — HOSPITAL ENCOUNTER (OUTPATIENT)
Dept: ULTRASOUND IMAGING | Age: 48
Discharge: HOME | End: 2023-07-26
Payer: COMMERCIAL

## 2023-07-24 DIAGNOSIS — R92.8 ABNORMAL MAMMOGRAM: ICD-10-CM

## 2023-07-24 DIAGNOSIS — R92.8 ABNORMAL MAMMOGRAM OF RIGHT BREAST: ICD-10-CM

## 2023-07-24 PROCEDURE — 76642 ULTRASOUND BREAST LIMITED: CPT

## 2023-07-24 PROCEDURE — G0279 TOMOSYNTHESIS, MAMMO: HCPCS

## 2024-01-13 ENCOUNTER — HOSPITAL ENCOUNTER (OUTPATIENT)
Age: 49
Discharge: HOME | End: 2024-01-13
Payer: COMMERCIAL

## 2024-01-13 LAB
ALBUMIN SERPL-MCNC: 4 G/DL (ref 3.5–5.2)
ALBUMIN/GLOB SERPL: 1 {RATIO} (ref 1–2.5)
ALP SERPL-CCNC: 122 U/L (ref 35–104)
ALT SERPL-CCNC: <5 U/L (ref 10–35)
ANION GAP SERPL CALCULATED.3IONS-SCNC: 11 MMOL/L (ref 9–16)
AST SERPL-CCNC: 19 U/L (ref 10–35)
BILIRUB SERPL-MCNC: 0.2 MG/DL (ref 0–1.2)
BUN SERPL-MCNC: 19 MG/DL (ref 6–20)
CALCIUM SERPL-MCNC: 9 MG/DL (ref 8.6–10.4)
CHLORIDE SERPL-SCNC: 102 MMOL/L (ref 98–107)
CHOLEST SERPL-MCNC: 199 MG/DL (ref 0–199)
CHOLESTEROL/HDL RATIO: 4
CO2 SERPL-SCNC: 23 MMOL/L (ref 20–31)
CREAT SERPL-MCNC: 0.6 MG/DL (ref 0.5–0.9)
GFR SERPL CREATININE-BSD FRML MDRD: >60 ML/MIN/1.73M2
GLUCOSE SERPL-MCNC: 168 MG/DL (ref 74–99)
HDLC SERPL-MCNC: 46 MG/DL
LDLC SERPL CALC-MCNC: 136 MG/DL (ref 0–100)
POTASSIUM SERPL-SCNC: 4 MMOL/L (ref 3.7–5.3)
PROT SERPL-MCNC: 7.7 G/DL (ref 6.6–8.7)
SODIUM SERPL-SCNC: 136 MMOL/L (ref 136–145)
TRIGL SERPL-MCNC: 88 MG/DL (ref 0–149)
TSH SERPL DL<=0.05 MIU/L-ACNC: 1.25 UIU/ML (ref 0.27–4.2)
VLDLC SERPL CALC-MCNC: 18 MG/DL

## 2024-01-13 PROCEDURE — 36415 COLL VENOUS BLD VENIPUNCTURE: CPT

## 2024-01-13 PROCEDURE — 80053 COMPREHEN METABOLIC PANEL: CPT

## 2024-01-13 PROCEDURE — 84443 ASSAY THYROID STIM HORMONE: CPT

## 2024-01-13 PROCEDURE — 80061 LIPID PANEL: CPT

## 2024-01-20 ENCOUNTER — HOSPITAL ENCOUNTER (OUTPATIENT)
Age: 49
Discharge: HOME | End: 2024-01-20
Payer: COMMERCIAL

## 2024-01-20 LAB
CREAT UR-MCNC: 66.7 MG/DL (ref 28–217)
MICROALBUMIN UR-MCNC: <12 MG/L (ref 0–20)
MICROALBUMIN/CREAT UR-RTO: NORMAL MCG/MG CREAT (ref 0–25)

## 2024-01-20 PROCEDURE — 82043 UR ALBUMIN QUANTITATIVE: CPT

## 2024-01-20 PROCEDURE — 82570 ASSAY OF URINE CREATININE: CPT

## 2024-02-19 ENCOUNTER — OFFICE VISIT (OUTPATIENT)
Dept: OBGYN CLINIC | Age: 49
End: 2024-02-19
Payer: COMMERCIAL

## 2024-02-19 VITALS
WEIGHT: 215.4 LBS | SYSTOLIC BLOOD PRESSURE: 125 MMHG | BODY MASS INDEX: 38.16 KG/M2 | DIASTOLIC BLOOD PRESSURE: 81 MMHG | HEIGHT: 63 IN

## 2024-02-19 DIAGNOSIS — Z97.5 IUD (INTRAUTERINE DEVICE) IN PLACE: ICD-10-CM

## 2024-02-19 DIAGNOSIS — Z12.31 ENCOUNTER FOR SCREENING MAMMOGRAM FOR BREAST CANCER: ICD-10-CM

## 2024-02-19 DIAGNOSIS — Z01.419 ENCOUNTER FOR GYNECOLOGICAL EXAMINATION: Primary | ICD-10-CM

## 2024-02-19 PROCEDURE — 99396 PREV VISIT EST AGE 40-64: CPT | Performed by: NURSE PRACTITIONER

## 2024-02-19 PROCEDURE — 3079F DIAST BP 80-89 MM HG: CPT | Performed by: NURSE PRACTITIONER

## 2024-02-19 PROCEDURE — G8484 FLU IMMUNIZE NO ADMIN: HCPCS | Performed by: NURSE PRACTITIONER

## 2024-02-19 PROCEDURE — 3074F SYST BP LT 130 MM HG: CPT | Performed by: NURSE PRACTITIONER

## 2024-02-19 ASSESSMENT — PATIENT HEALTH QUESTIONNAIRE - PHQ9
2. FEELING DOWN, DEPRESSED OR HOPELESS: 0
1. LITTLE INTEREST OR PLEASURE IN DOING THINGS: 0
SUM OF ALL RESPONSES TO PHQ QUESTIONS 1-9: 0
SUM OF ALL RESPONSES TO PHQ9 QUESTIONS 1 & 2: 0
SUM OF ALL RESPONSES TO PHQ QUESTIONS 1-9: 0

## 2024-02-19 ASSESSMENT — ENCOUNTER SYMPTOMS
ABDOMINAL DISTENTION: 0
COUGH: 0
GASTROINTESTINAL NEGATIVE: 1
RESPIRATORY NEGATIVE: 1
ALLERGIC/IMMUNOLOGIC NEGATIVE: 1
BACK PAIN: 0
SHORTNESS OF BREATH: 0

## 2024-02-19 NOTE — PROGRESS NOTES
[facility] [place]  [address]  [facility] [address]  Dept: [phone]      24    Pippa Dennis     Gyn Annual Exam      CHIEF COMPLAINT:    Chief Complaint   Patient presents with    Annual Exam     Last pap 22 WNL HPV- last mammogram 23 & 23               Blood pressure 125/81, height 1.588 m (5' 2.5\"), weight 97.7 kg (215 lb 6.4 oz), not currently breastfeeding.           HPI :     Pippa Dennis 1975 is a 48 y.o.   who is here for her annual exam. She has a Mirena that was placed 2022 and get random spotting    Uses Mirena for contraception.   _____________________________________________________________________  Past Medical History:   Diagnosis Date    Depression     Hyperlipidemia     Hypertension     Low grade squamous intraepith lesion on cytologic smear cervix (lgsil) 2015    HPV+    Type 1 diabetes mellitus (HCC)                                                                    Past Surgical History:   Procedure Laterality Date    APPENDECTOMY       SECTION      x 2    COLPOSCOPY  2015    DELILAH-1    COLPOSCOPY  2016    Cx Bx DELILAH-1    INTRAUTERINE DEVICE INSERTION  2022    Mirena    LEEP  2016     Family History   Problem Relation Age of Onset    High Blood Pressure Father     High Cholesterol Father     Heart Defect Paternal Grandmother     Diabetes Paternal Grandmother     Heart Defect Paternal Grandfather     Other Mother 60         bowel problem, bleeding    Mental Illness Mother     Other Daughter 19         del, multiple health issues      Social History     Tobacco Use   Smoking Status Never   Smokeless Tobacco Never     Social History     Substance and Sexual Activity   Alcohol Use No     Current Outpatient Medications   Medication Sig Dispense Refill    aspirin 81 MG chewable tablet 1 tablet      insulin lispro, 1

## 2024-08-03 ENCOUNTER — HOSPITAL ENCOUNTER (OUTPATIENT)
Dept: MAMMOGRAPHY | Age: 49
End: 2024-08-03
Payer: COMMERCIAL

## 2024-08-03 VITALS — BODY MASS INDEX: 32.44 KG/M2 | HEIGHT: 64 IN | WEIGHT: 190 LBS

## 2024-08-03 DIAGNOSIS — Z12.31 ENCOUNTER FOR SCREENING MAMMOGRAM FOR BREAST CANCER: ICD-10-CM

## 2024-08-03 PROCEDURE — 77063 BREAST TOMOSYNTHESIS BI: CPT
